# Patient Record
Sex: FEMALE | Race: WHITE
[De-identification: names, ages, dates, MRNs, and addresses within clinical notes are randomized per-mention and may not be internally consistent; named-entity substitution may affect disease eponyms.]

---

## 2019-03-02 ENCOUNTER — HOSPITAL ENCOUNTER (EMERGENCY)
Dept: HOSPITAL 80 - CED | Age: 34
LOS: 1 days | Discharge: HOME | End: 2019-03-03
Payer: MEDICAID

## 2019-03-03 ENCOUNTER — HOSPITAL ENCOUNTER (OUTPATIENT)
Dept: HOSPITAL 80 - FED | Age: 34
Setting detail: OBSERVATION
LOS: 1 days | Discharge: HOME | End: 2019-03-04
Attending: INTERNAL MEDICINE | Admitting: INTERNAL MEDICINE
Payer: MEDICAID

## 2019-03-03 DIAGNOSIS — F17.200: ICD-10-CM

## 2019-03-03 DIAGNOSIS — F32.9: ICD-10-CM

## 2019-03-03 DIAGNOSIS — L03.114: Primary | ICD-10-CM

## 2019-03-03 DIAGNOSIS — F43.10: ICD-10-CM

## 2019-03-03 DIAGNOSIS — Z88.0: ICD-10-CM

## 2019-03-03 DIAGNOSIS — F41.9: ICD-10-CM

## 2019-03-03 DIAGNOSIS — J45.909: ICD-10-CM

## 2019-03-03 LAB
PLATELET # BLD: (no result) 10^3/UL (ref 150–400)
PLATELET # BLD: 226 10^3/UL (ref 150–400)

## 2019-03-03 PROCEDURE — 96375 TX/PRO/DX INJ NEW DRUG ADDON: CPT

## 2019-03-03 PROCEDURE — 96374 THER/PROPH/DIAG INJ IV PUSH: CPT

## 2019-03-03 PROCEDURE — G0378 HOSPITAL OBSERVATION PER HR: HCPCS

## 2019-03-03 PROCEDURE — 96376 TX/PRO/DX INJ SAME DRUG ADON: CPT

## 2019-03-03 PROCEDURE — 99285 EMERGENCY DEPT VISIT HI MDM: CPT

## 2019-03-03 RX ADMIN — KETOROLAC TROMETHAMINE PRN MG: 15 INJECTION, SOLUTION INTRAMUSCULAR; INTRAVENOUS at 21:44

## 2019-03-03 RX ADMIN — Medication SCH MLS: at 17:08

## 2019-03-03 RX ADMIN — KETOROLAC TROMETHAMINE PRN MG: 15 INJECTION, SOLUTION INTRAMUSCULAR; INTRAVENOUS at 15:40

## 2019-03-03 RX ADMIN — FLUTICASONE PROPIONATE AND SALMETEROL SCH PUFFS: 50; 500 POWDER RESPIRATORY (INHALATION) at 21:37

## 2019-03-03 NOTE — PDGENHP
History and Physical





- Chief Complaint


left arm swelling and pain





- History of Present Illness


32yo F with past history of IVDU presents with left arm swelling, pain, and 

redness. She had a blood draw about 1.5 weeks ago. She was getting labs to work 

up her unexpected 25-30 pound weight gain. Since that time her left forearm hasn

't felt right and has been uncomfortable. Yesterday morning her arm became very 

swollen and red around her elbow and forearm. She went to the [facility] and had an 

ultrasound which was negative for DVT but did show cellulitis and phlegmon/

early abscess. She was advised inpatient admission but didn't have  

set up. She was given 2g IV vancomycin and discharged with clindamycin. She 

took clindamycin this AM. She has been having chills but no fevers. She denies 

any IVDU since she was 19 years old. She hasn't noticed any open wounds or 

drainage. Denies history of MRSA infections. In the ED, Dr Valentin of surgery was 

consulted. She is being admitted for further management. Case discussed with ED 

provider Zac Servin.





History Information





- Allergies/Home Medication List


Allergies/Adverse Reactions: 








Penicillins Allergy (Severe, Verified 03/03/19 12:29)


 Hives


egg [Egg] Allergy (Verified 03/03/19 12:29)


 


hydrocodone Allergy (Verified 03/03/19 12:29)


 


latex Allergy (Verified 03/03/19 12:29)


 





Home Medications: 








Albuterol Sulfate [Proair Hfa] 1 - 2 puffs IH Q4H PRN 03/03/19 [Last Taken 

Unknown]


Clindamycin 300 mg PO DAILY 03/03/19 [Last Taken 03/03/19]


Fluticasone/Salmeter 500/50Mcg [Advair 500/50 (*)] 1 puffs IH BID 03/03/19 [

Last Taken 03/03/19]


Multivitamins [Multivitamin (*)] 1 each PO DAILY 03/03/19 [Last Taken Unknown]


Phentermine HCl 18.75 mg PO DAILY 03/03/19 [Last Taken 03/02/19]





I have personally reviewed and updated: family history, medical history, social 

history, surgical history





- Past Medical History


Additional medical history: asthma, former IVDU





- Surgical History


Reports: no pertinent surgical hx





- Family History


Positive for: non-pertinent





- Social History


Smoking Status: Light smoker


Alcohol Use: None


Drug Use: Other (prior IV heroin use, none since age 19)





Review of Systems


Review of Systems: 





ROS: 10pt was reviewed & negative except for what was stated in HPI & below





Physical Exam


Physical Exam: 

















Temp Pulse Resp BP Pulse Ox


 


 36.9 C   74   18   121/68 H  99 


 


 03/03/19 14:45  03/03/19 14:45  03/03/19 14:45  03/03/19 14:45  03/03/19 14:45











Constitutional: no apparent distress, appears nourished, not in pain


Eyes: PERRL, anicteric sclera, EOMI


Ears, Nose, Mouth, Throat: moist mucous membranes, hearing normal, ears appear 

normal, no oral mucosal ulcers


Cardiovascular: regular rate and rhythym, no murmur, rub, or gallop, other (2+ 

left radial pulse), No edema


Respiratory: no respiratory distress, no rales or rhonchi, clear to auscultation


Gastrointestinal: normoactive bowel sounds, soft, non-tender abdomen, no 

palpable masses


Genitourinary: no bladder fullness, no bladder tenderness


Skin: erythema (around left arm/forearm without lymphangitic streaking, edema 

of left arm)


Musculoskeletal: full muscle strength, no muscle tenderness, normal joint ROM, 

no joint effusions


Neurologic: AAOx3, sensation intact bilaterally


Psychiatric: interacting appropriately, not anxious, not encephalopathic, 

thought process linear





Lab Data & Imaging Review





 03/03/19 14:40





 03/03/19 13:25














WBC  5.07 10^3/uL (3.80-9.50)   03/03/19  14:40    


 


RBC  4.40 10^6/uL (4.18-5.33)   03/03/19  14:40    


 


Hgb  13.2 g/dL (12.6-16.3)   03/03/19  14:40    


 


Hct  38.9 % (38.0-47.0)   03/03/19  14:40    


 


MCV  88.4 fL (81.5-99.8)   03/03/19  14:40    


 


MCH  30.0 pg (27.9-34.1)   03/03/19  14:40    


 


MCHC  33.9 g/dL (32.4-36.7)   03/03/19  14:40    


 


RDW  12.6 % (11.5-15.2)   03/03/19  14:40    


 


Plt Count  226 10^3/uL (150-400)   03/03/19  14:40    


 


MPV  9.2 fL (8.7-11.7)   03/03/19  14:40    


 


Neut % (Auto)  53.7 % (39.3-74.2)   03/03/19  14:40    


 


Lymph % (Auto)  26.8 % (15.0-45.0)   03/03/19  14:40    


 


Mono % (Auto)  7.9 % (4.5-13.0)   03/03/19  14:40    


 


Eos % (Auto)  10.8 % (0.6-7.6)  H  03/03/19  14:40    


 


Baso % (Auto)  0.6 % (0.3-1.7)   03/03/19  14:40    


 


Nucleat RBC Rel Count  0.0 % (0.0-0.2)   03/03/19  14:40    


 


Absolute Neuts (auto)  2.72 10^3/uL (1.70-6.50)   03/03/19  14:40    


 


Absolute Lymphs (auto)  1.36 10^3/uL (1.00-3.00)   03/03/19  14:40    


 


Absolute Monos (auto)  0.40 10^3/uL (0.30-0.80)   03/03/19  14:40    


 


Absolute Eos (auto)  0.55 10^3/uL (0.03-0.40)  H  03/03/19  14:40    


 


Absolute Basos (auto)  0.03 10^3/uL (0.02-0.10)   03/03/19  14:40    


 


Absolute Nucleated RBC  0.00 10^3/uL (0-0.01)   03/03/19  14:40    


 


Immature Gran %  0.2 % (0.0-1.1)   03/03/19  14:40    


 


Immature Gran #  0.01 10^3/uL (0.00-0.10)   03/03/19  14:40    


 


ESR  15 MM/HR (0-20)   03/03/19  14:40    


 


Sodium  139 mEq/L (135-145)   03/03/19  13:25    


 


Potassium  4.8 mEq/L (3.5-5.2)   03/03/19  13:25    


 


Chloride  107 mEq/L ()   03/03/19  13:25    


 


Carbon Dioxide  24 mEq/l (22-31)   03/03/19  13:25    


 


Anion Gap  8 mEq/L (6-14)   03/03/19  13:25    


 


BUN  15 mg/dL (7-23)   03/03/19  13:25    


 


Creatinine  0.9 mg/dL (0.6-1.0)   03/03/19  13:25    


 


Estimated GFR  > 60   03/03/19  13:25    


 


Glucose  94 mg/dL ()   03/03/19  13:25    


 


Calcium  9.3 mg/dL (8.5-10.4)   03/03/19  13:25    


 


C-Reactive Protein  17.7 mg/L (<10.0)  H  03/03/19  13:25    


 


Specimen Hemolysis  136   03/03/19  13:25    








Interpretation: Left arm x-ray (3/2): no bony abnormality, no gas but she does 

have soft tissue swelling.  Left arm ultrasound (3/2): no DVT, left antecubital 

fossa phlegmon/early abscess





Assessment & Plan


Assessment: 


32yo F with past history of IVDU presents with left arm swelling, pain, and 

redness. 


Plan: 





#Left arm cellulitis and early abscess: Reasonable to assume related to recent 

blood draw but h/o IVDU is concerning. She is not septic. No history of MRSA 

but with underlying abscess will cover. 


   - IV vancomycin


   - 1 set of blood cultures were drawn yesterday


   - Dr Valentin of surgery was consulted in ED, will likely require I&D


   - Tylenol, heating pads for pain control (avoid narcotics with heroin history

)





#H/o IVDU: Denies using since age 19.





#Recent weight gain: Being evaluated by PCP. 





#Asthma: No flare. Continue home inhalers.





VTE ppx: SCDs


Code: full


Dispo: Admit under observation

## 2019-03-03 NOTE — EDPHY
General





- History


Smoking Status: Light smoker


Time Seen by Provider: 03/03/19 12:54


Narrative: 





CLINICAL IMPRESSION: 


Left elbow and forearm cellulitis 


_________________


ASSESSMENT/PLAN:


33-year-old female presents to the emergency department for admission after she 

was seen at [facility] yesterday for left arm cellulitis 2 weeks 

after a blood draw and this arm.  Patient has a distant history of IV drug 

abuse but does not report a history of MRSA.  IV access is difficult.  She 

received 2 g of vancomycin yesterday.  She reports increased erythema and 

swelling extending to the forearm and hand today.  No documented fevers, chills

, palpitations, nausea or vomiting.  No clinical signs of compartment syndrome.

  Distal neurovascular exam is intact.  Limited range of motion of the elbow 

secondary to pain.  Ultrasound yesterday suggestive of a 2 x 3 cm fluid 

collection concerning for phlegmon versus early abscess.  Case discussed with 

Dr. Valentin who saw the patient in the emergency department.  Will plan to admit 

to the hospitalist service for IV antibiotics and close observation.  Case 

reviewed with Dr. Mason who saw and examined the patient in the ED.  Patient 

was stabilized in the ED for admission. 


_________________


DIFFERENTIAL DX:


Differential includes but not limited to cellulitis, abscess, compartment 

syndrome, septic joint, bacteremia 


_________________


ED PROCEDURES:


See lab and/or imaging results below  


_________________


ED COURSE:


Patient seen assessed by myself.  Previous records reviewed from [facility] yesterday. Discussed with Dr. Mason.  Patient 

admits to abusing heroin in her teens and 20s but denies current abuse.  

Working on IV access.  Plan to admit.  Plan to ultrasound the arm to assess for 

drainable abscess today.


Case discussed with Dr. Mason who also spoke to Dr. Valentin.  Dr. Valentin saw the 

patient in the ED.  Plan to admit the patient to hospitalist service.  I 

discussed with Dr. Beckwith.


_________________


CHIEF COMPLAINT:  Left arm swelling


_________________


HPI:


33-year-old female with a distant history of IV drug abuse presents to the 

emergency department with left elbow, forearm and hand swelling over the last 

several days.  Patient was seen and evaluated at the [facility] yesterday.  She was diagnosed with cellulitis and a possible 

early abscess over the left elbow.  She reports she had a blood draw in this 

area 2 weeks ago.  She continued to have pain and discomfort since that time 

but over the last several days has noticed increased swelling, redness and 

warmth.  She had laboratory evaluation, x-ray and ultrasound yesterday.  There 

was some concern whether she may have had an early abscess in the elbow.  She 

did not have this drained yesterday.  Admission was recommended however she 

could not get  coverage at the time.  She received IV vancomycin and 

was discharged home with instructions to return to our ED today.  She arrives 

with her mother and 7-year-old son and her parents are willing to take care of 

her children.  She reports no history of MRSA.  She denies fever, chills, nausea

, vomiting, palpitations.  She reports today her left hand became swollen and 

red.  She has full range of motion of fingers and thumb.  No reports of 

numbness to the hand.  No pain into the axilla. 


_________________


PAST MEDICAL HISTORY: 


Depression, Anxiety, PTSD, Asthma 


See nurse/triage notes for additional history if applicable 





Pertinent Past Surgical History:  C section nasal reconstruction





Family History:  Noncontributory





Social History:  Reported distant history of IV drug abuse in her teens and 

20s.  Denies IV drug abuse currently


_________________


REVIEW OF SYSTEMS:


All other systems negative


Constitutional:  No fever, no chills, appetite change.


Cardiovascular:  No chest pain, no palpitations.


Respiratory:  No cough, no shortness of breath.


Gastrointestinal: No abdominal pain, no vomiting, diarrhea.


Musculoskeletal:  No back pain, positive for joint swelling, joint pain, 

myalgias.


Skin:  No rashes, positive for color change


Neurological:  No headache, dizziness, weakness.





_________________


PHYSICAL EXAM:


General Appearance:  Alert, oriented, appropriate, cooperative, NAD, well 

hydrated, non-toxic appearing, VSS, no hypoxia.


Respiratory:  There are no retractions, lungs are clear to auscultation.


Cardiac:  Regular rate and rhythm, no murmurs or gallops.


Gastrointestinal: Abdomen is soft, nontender


Neurological: [ Alert and oriented x 3, CN 2-12 grossly intact


Skin: Erythema and warmth noted to the volar aspect of the left elbow extending 

to mid forearm.  Erythema also noted to the dorsum of the left hand although 

difficult to fully assess the extent of this given a large tattoo on the hand.  

no palpable area of fluctuance on the elbow or forearm


Musculoskeletal:  Asymmetric swelling of the left elbow forearm and hand 

compared to right.  Compartments soft.  Distal neurovascular exam intact.  

Patient is unable to fully extend the left elbow due to pain.  Intact range of 

motion of the left wrist, hand and fingers.  No open wound.  Multiple scars 

noted to bilateral AC regions.


Psychiatric:  Patient is oriented X 3, there is no agitation.





_________________


MEDICAL DECISION MAKING:


Secondary supervising physician at time of evaluation was Dr. Mason who also 

saw and examined the patient.


Diagnosis: Left elbow and forearm cellulitis . New, requires workup


Summary:  See Assessment and Plan for summary of ED visit 


Clinical lab tests:  ordered / reviewed.


Independent visualization of images, tracing, or specimens:  Yes.


Decision to obtain medical records or history from someone other than the 

patient:  No


Review / Summarize previous medical records:  Reviewed recent [facility] record


Discussed patient with another provider:  Dr. Mason, Dr. Valentin





Patient Progress:  Stable for admission.  (Zac Servin)


Medical Decision Making: 





Independent physician evaluation:





I evaluated and participated in the management of the patient.  I also 

evaluated the patient independently.  My co-signature indicates that I have 

reviewed this chart and I agree with the findings and plan of care as 

documented.  My personal H&P findings include:  The patient presents the ED 

with worsening pain, redness and swelling to her left forearm and hand.  The 

patient was seen at the freestanding ED yesterday and diagnosed with 

cellulitis.  She was offered admission however declined secondary to childcare 

needs.  She returns today secondary to ongoing pain and symptoms.  Patient was 

started on clindamycin yesterday which she has continued to take.  She did 

receive a dose of vancomycin.





Physical exam:





General Appearance:  Alert, no distress


Eyes:  Pupils equal and round no pallor or injection


ENT, Mouth:  Mucous membranes moist


Respiratory:  There are no retractions, lungs are clear to auscultation


Cardiovascular:  Regular rate and rhythm


Gastrointestinal:  Abdomen is soft and nontender, no masses, bowel sounds normal


Neurological:  5/5 strength noted all 4 extremities


Skin:  Cellulitis induration noted throughout the left upper extremity and 

hand.  Tenderness in the left antecubital fossa.


Musculoskeletal:  Neck is supple nontender


Extremities:  symmetrical, full range of motion





ED course:


Patient is nontoxic and well-appearing presents to the ED with left upper 

extremity cellulitis and a small subcutaneous abscess noted on ultrasound 

yesterday.





I placed a ultrasound guided IV catheter in her right antecubital fossa.





Blood cultures has been ordered.  The patient received vancomycin.





Consultation was made with Dr. Valentin at 2:00 p.m. for ED evaluation.





The patient will be admitted to the hospitalist service for IV antibiotic 

therapy.





 (Rodríguez Mason)


Procedures: 








Procedure:  Ultrasound guidance for IV placement.





Indication:  The nurse requested that I place an intravenous catheter because 

of technical difficulties with this procedure on this patient.  Procedure in 

details:  Using the linear probe covered in a sterile sheath, a short axis of 

the basilic vein was obtained.  This vein was completely compressible and was 

identified as separate from the adjacent noncompressible arterial structure.  

Under real-time guidance, the intravenous needle was observed to tent the vein 

and then to puncture it.





Insertion of intravenous catheter:  I prepped the patient's skin with 

chlorhexidine.  I placed an 20 gauge gauge intravenous catheter in the 

visualized vein.  Procedure performed by myself Dr. Rodríguez Mason.





 (Rodríguez Mason)





- Objective


Vital Signs: 


 Initial Vital Signs











Temperature (C)  36.8 C   03/03/19 12:29


 


Heart Rate  77   03/03/19 12:29


 


Respiratory Rate  17   03/03/19 12:29


 


Blood Pressure  119/70   03/03/19 12:29


 


O2 Sat (%)  99   03/03/19 12:29








 











O2 Delivery Mode               Room Air














Allergies/Adverse Reactions: 


 





Penicillins Allergy (Severe, Verified 03/03/19 12:29)


 Hives


egg [Egg] Allergy (Verified 03/03/19 12:29)


 


hydrocodone Allergy (Verified 03/03/19 12:29)


 


latex Allergy (Verified 03/03/19 12:29)


 








Home Medications: 














 Medication  Instructions  Recorded


 


Albuterol Sulfate [Proair Hfa] 1 - 2 puffs IH Q4H PRN 03/03/19


 


Clindamycin 300 mg PO DAILY 03/03/19


 


Fluticasone/Salmeter 500/50Mcg 1 puffs IH BID 03/03/19





[Advair 500/50 (*)]  


 


Multivitamins [Multivitamin (*)] 1 each PO DAILY 03/03/19


 


Phentermine HCl 18.75 mg PO DAILY 03/03/19











Laboratory Results: 


 Laboratory Results





 03/03/19 13:25 





 03/03/19 13:25 





 











  03/03/19 03/03/19





  13:25 13:25


 


WBC    REJ 





   


 


RBC    REJ 





   


 


Hgb    REJ 





   


 


Hct    REJ 





   


 


MCV    REJ 





   


 


MCH    REJ 





   


 


MCHC    REJ 





   


 


RDW    REJ 





   


 


Plt Count    REJ 





   


 


MPV    REJ 





   


 


Neut % (Auto)    REJ 





   


 


Lymph % (Auto)    REJ 





   


 


Mono % (Auto)    REJ 





   


 


Eos % (Auto)    REJ 





   


 


Baso % (Auto)    REJ 





   


 


Nucleat RBC Rel Count    REJ 





   


 


Absolute Neuts (auto)    REJ 





   


 


Absolute Lymphs (auto)    REJ 





   


 


Absolute Monos (auto)    REJ 





   


 


Absolute Eos (auto)    REJ 





   


 


Absolute Basos (auto)    REJ 





   


 


Absolute Nucleated RBC    Not Reported 





   


 


Immature Gran %    REJ 





   


 


Immature Gran #    REJ 





   


 


ESR    REJ 





   


 


Sodium  139 mEq/L mEq/L  





   (135-145)  


 


Potassium  4.8 mEq/L mEq/L  





   (3.5-5.2)  


 


Chloride  107 mEq/L mEq/L  





   ()  


 


Carbon Dioxide  24 mEq/l mEq/l  





   (22-31)  


 


Anion Gap  8 mEq/L mEq/L  





   (6-14)  


 


BUN  15 mg/dL mg/dL  





   (7-23)  


 


Creatinine  0.9 mg/dL mg/dL  





   (0.6-1.0)  


 


Estimated GFR  > 60   





   


 


Glucose  94 mg/dL mg/dL  





   ()  


 


Calcium  9.3 mg/dL mg/dL  





   (8.5-10.4)  


 


C-Reactive Protein  17.7 mg/L H mg/L  





   (<10.0)  


 


Specimen Hemolysis  136   





   











Medications Given: 


 





Ketorolac Tromethamine (Toradol)  15 mg IVP Q6HRS PRN


   PRN Reason: Pain, Breakthrough


   Stop: 03/08/19 17:59


   Last Admin: 03/03/19 15:40 Dose:  15 mg








Departure





- Departure


Disposition: [facility]


Clinical Impression: 


 Abscess of antecubital fossa





Cellulitis


Qualifiers:


 Site of cellulitis: extremity Site of cellulitis of extremity: upper extremity 

Laterality: left Qualified Code(s): L03.114 - Cellulitis of left upper limb





Condition: Fair

## 2019-03-04 VITALS — SYSTOLIC BLOOD PRESSURE: 113 MMHG | DIASTOLIC BLOOD PRESSURE: 58 MMHG

## 2019-03-04 RX ADMIN — Medication SCH MLS: at 04:28

## 2019-03-04 RX ADMIN — FLUTICASONE PROPIONATE AND SALMETEROL SCH PUFFS: 50; 500 POWDER RESPIRATORY (INHALATION) at 09:26

## 2019-03-04 NOTE — PDDCSUM
Discharge Summary


Discharge Summary: 


Discharge diagnosis


Left arm antecubital fossa cellulitis


Left arm abscess


Methadone dependence











Patient is a 33-year-old female with past medical history of IV drug use and 

continued methadone dependence who presented to the emergency room with 

complaints of left arm pain.  She had been seen at an outside ER was diagnosed 

with a cellulitis and started on clindamycin.  She presents to the ER with 

increased pain swelling and redness of her arm.  Ultrasound was obtained that 

showed a fluid collection consistent with an abscess.  General surgery was 

consulted who initially thought that the patient would require an incision and 

drainage.  She was started on IV vancomycin and dramatically improved over 

night.  By the morning her swelling had decreased substantially.  Surgery re-

evaluated her and felt that she could likely be discharged without needing an 

incision and drainage.  They did ask that she follow up in the clinic to ensure 

that it was resolving.  She was discharged home on clindamycin 300 mg three 

times daily to follow up with General surgery in clinic in the next 2-3 days to 

ensure her arm was healing.





Discharge disposition


Home in good condition





New medications


Clindamycin 300 mg three times daily





Follow-up


Liz Valentin from General surgery

## 2019-03-04 NOTE — GCON
[f 
rep st]



                                                                    CONSULTATION





CHIEF COMPLAINT:  Left upper extremity swelling.



HISTORY OF PRESENT ILLNESS:  This is a 33-year-old female with a past history 
of intravenous drug use who is admitted with increased left arm swelling, pain 
and redness.  She reports that she had a blood draw about a week and a half ago 
to work up her unexpected 25- to 30-pound weight gain.  Since then, she reports 
that her left forearm has become increasingly more swollen and painful.  She 
underwent an ultrasound at [facility] that was negative for a DVT but did show 
cellulitis and early abscess.  Since then, she has been on intravenous 
vancomycin and oral clindamycin.  Today, the patient reports mild chills but no 
fevers.  She denies any active drainage.



REVIEW OF SYSTEMS:  A 10-point review of systems was performed and is negative 
aside from what is in the HPI.



PAST MEDICAL HISTORY:  Intravenous heroin use, asthma.



PAST SURGICAL HISTORY:  None.



FAMILY HISTORY:  Noncontributory.



ALLERGIES:  Penicillin, eggs, hydrocodone, latex.



SOCIAL HISTORY:  The patient is a current light smoker, she denies current 
recreational drug use.



MEDICATIONS:  

1.  Albuterol.

2.  Advair.

3.  Multivitamins.

4.  Phentermine.



PHYSICAL EXAM:  GENERAL:  A well-appearing female resting in a hospital bed in 
no acute distress.  HEENT:  Normocephalic, atraumatic.  No gross hearing 
deficits.  Mucous membranes moist, PERRLA.  CARDIOVASCULAR:  Regular rate and 
rhythm, no clicks, murmurs or rubs.  RESPIRATORY:  Clear to auscultation 
bilaterally.  No increased work of breathing.  ABDOMEN:  Soft, nontender, 
nondistended.  MUSCULOSKELETAL:  Moves all extremities equally x4.  NEUROLOGIC:
  Alert, oriented x3.  PSYCHIATRIC:  Appropriate mood and affect.  INTEGUMENTARY
:  Left forearm has diffuse erythema throughout, extending from the antecubital 
fossa down to the wrist.  2+ edema present.  Strong radial pulse.  No fluctuance
, drainage or malodor.



IMPRESSION AND PLAN:  This is a 33-year-old female with an early abscess in her 
left antecubital fossa.  We have discussed all risks and options.  Risks of 
surgery include, but are not limited to, infection, bleeding, damage to 
surrounding structures and nerves, need for further surgery, heart attack and 
death.  Patient understands and wishes to proceed.  We will proceed with left 
antecubital fossa abscess incision and drainage.





Job #:  [id number]

MTDD

## 2019-03-04 NOTE — SOAPPROG
SOAP Progress Note


Assessment/Plan: 


Assessment/Plan:





Patient reports arm swelling and redness are much improved with antibiotics.  

She does not wish to have surgery at this time.  Ok to discharge from surgical 

perspective.  Follow up in Dr. Valentin' office later this week.





03/04/19 12:26





Objective: 





 Vital Signs











Temp Pulse Resp BP Pulse Ox


 


 36.4 C   69   16   113/58 L  98 


 


 03/04/19 11:39  03/04/19 11:39  03/04/19 11:39  03/04/19 11:39  03/04/19 11:39








 Laboratory Results





 03/03/19 14:40 











ICD10 Worksheet


Patient Problems: 


 Problems











Problem Status Onset


 


Abscess of antecubital fossa Acute  


 


Cellulitis Acute

## 2019-03-04 NOTE — ASMTCMCOM
CM Note

 

CM Note                       

Notes:

Pts case discussed w/ Dr. Turk and DENVER Jansen. Pt is a 32 y/o female admitted for left elbow and 


forearm cellulitis. Surgery is consulted. Pt may have an I&D. Pt will most likely d/c independent 

when medically stable. It is uncertain if pt will require ivabx. Referral made to [facility]. CM 

available for changes.







Plan: Independent  

 

Date Signed:  03/04/2019 12:03 PM

Electronically Signed By:DELPHINE Rojas